# Patient Record
Sex: FEMALE
[De-identification: names, ages, dates, MRNs, and addresses within clinical notes are randomized per-mention and may not be internally consistent; named-entity substitution may affect disease eponyms.]

---

## 2017-12-26 ENCOUNTER — APPOINTMENT (OUTPATIENT)
Dept: OTOLARYNGOLOGY | Facility: CLINIC | Age: 32
End: 2017-12-26
Payer: COMMERCIAL

## 2017-12-26 VITALS
HEIGHT: 62 IN | BODY MASS INDEX: 26.5 KG/M2 | HEART RATE: 71 BPM | DIASTOLIC BLOOD PRESSURE: 69 MMHG | SYSTOLIC BLOOD PRESSURE: 105 MMHG | WEIGHT: 144 LBS

## 2017-12-26 DIAGNOSIS — Z78.9 OTHER SPECIFIED HEALTH STATUS: ICD-10-CM

## 2017-12-26 DIAGNOSIS — Z80.7 FAMILY HISTORY OF OTHER MALIGNANT NEOPLASMS OF LYMPHOID, HEMATOPOIETIC AND RELATED TISSUES: ICD-10-CM

## 2017-12-26 PROCEDURE — 69210 REMOVE IMPACTED EAR WAX UNI: CPT

## 2017-12-26 PROCEDURE — 99203 OFFICE O/P NEW LOW 30 MIN: CPT | Mod: 25

## 2017-12-26 RX ORDER — CLOBETASOL PROPIONATE 0.5 MG/G
0.05 CREAM TOPICAL
Qty: 60 | Refills: 0 | Status: DISCONTINUED | COMMUNITY
Start: 2017-10-12

## 2017-12-26 RX ORDER — PREDNISONE 10 MG/1
10 TABLET ORAL
Qty: 17 | Refills: 0 | Status: DISCONTINUED | COMMUNITY
Start: 2017-10-12

## 2017-12-26 RX ORDER — LEVONORGESTREL AND ETHINYL ESTRADIOL 150-30(84)
0.15-0.03 &0.01 KIT ORAL
Qty: 91 | Refills: 0 | Status: DISCONTINUED | COMMUNITY
Start: 2017-07-05

## 2017-12-26 RX ORDER — LEVONORGESTREL/ETHIN.ESTRADIOL 0.15-0.03
TABLET, DOSE PACK, 3 MONTHS ORAL
Refills: 0 | Status: ACTIVE | COMMUNITY

## 2020-05-26 ENCOUNTER — APPOINTMENT (OUTPATIENT)
Dept: OTOLARYNGOLOGY | Facility: CLINIC | Age: 35
End: 2020-05-26
Payer: COMMERCIAL

## 2020-05-26 VITALS — TEMPERATURE: 98.5 F

## 2020-05-26 VITALS
HEART RATE: 94 BPM | DIASTOLIC BLOOD PRESSURE: 88 MMHG | HEIGHT: 63 IN | WEIGHT: 157 LBS | SYSTOLIC BLOOD PRESSURE: 119 MMHG | BODY MASS INDEX: 27.82 KG/M2

## 2020-05-26 DIAGNOSIS — H93.8X3 OTHER SPECIFIED DISORDERS OF EAR, BILATERAL: ICD-10-CM

## 2020-05-26 PROCEDURE — 99213 OFFICE O/P EST LOW 20 MIN: CPT

## 2020-05-26 NOTE — HISTORY OF PRESENT ILLNESS
[de-identified] : 34 year old female follow up for clogged ears. Denies otalgia, otorrhea, ear infections, hearing loss, tinnitus, dizziness, vertigo, headaches related to hearing. \par